# Patient Record
(demographics unavailable — no encounter records)

---

## 2021-10-05 NOTE — NUR
Report recieved from Ronal CARRILLO. Pt resting in [facility] breathing is even and 
unlabored. Vital signs holding.

## 2021-10-05 NOTE — NUR
pt bib matias ambualnce for a fall in the shower today at 0100. pt has a hx of 
parkinson and felt his knees buckle in the bathroom and fell and hit his head 
on the shower rail. has a 1 in ch lac to top of head. pt is from home and lives 
with his wife. states 5/10 pain to top of head. a&ox4. no other complaints at 
this time

## 2021-10-05 NOTE — NUR
Patient given written and verbal discharge instructions and verbalizes 
understanding.  ER MD discussed with patient the results and treatment 
provided. Patient in stable condition. ID arm band removed.. Patient educated 
on pain management and to follow up with PMD. Pain Scale 0/10.

Opportunity for questions provided and answered. Medication side effect fact 
sheet provided.